# Patient Record
Sex: MALE | Race: WHITE | NOT HISPANIC OR LATINO | URBAN - METROPOLITAN AREA
[De-identification: names, ages, dates, MRNs, and addresses within clinical notes are randomized per-mention and may not be internally consistent; named-entity substitution may affect disease eponyms.]

---

## 2017-04-14 ENCOUNTER — EMERGENCY (EMERGENCY)
Facility: HOSPITAL | Age: 62
LOS: 1 days | Discharge: HOME | End: 2017-04-14
Attending: EMERGENCY MEDICINE | Admitting: EMERGENCY MEDICINE
Payer: COMMERCIAL

## 2017-04-14 VITALS
OXYGEN SATURATION: 96 % | SYSTOLIC BLOOD PRESSURE: 124 MMHG | DIASTOLIC BLOOD PRESSURE: 80 MMHG | HEART RATE: 82 BPM | RESPIRATION RATE: 18 BRPM | TEMPERATURE: 98 F

## 2017-04-14 DIAGNOSIS — S20.461A INSECT BITE (NONVENOMOUS) OF RIGHT BACK WALL OF THORAX, INITIAL ENCOUNTER: ICD-10-CM
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
## 2017-04-14 DIAGNOSIS — S30.860A INSECT BITE (NONVENOMOUS) OF LOWER BACK AND PELVIS, INITIAL ENCOUNTER: ICD-10-CM
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
## 2017-04-14 DIAGNOSIS — Z88.0 ALLERGY STATUS TO PENICILLIN: ICD-10-CM

## 2017-04-14 PROCEDURE — 99283 EMERGENCY DEPT VISIT LOW MDM: CPT

## 2017-04-14 PROCEDURE — 99283 EMERGENCY DEPT VISIT LOW MDM: CPT | Mod: 25

## 2017-04-14 RX ADMIN — Medication 200 MILLIGRAM(S): at 23:32

## 2017-04-14 NOTE — ED PROVIDER NOTE - OBJECTIVE STATEMENT
60y/o male c/o tick bite. pt states noticed tick on back 20 minutes prior to arrival. pt feeling well. pt states doorman was unable to remove tick. pt admits to recent [facility] travel but states only noticed tick today. no joint pain or rashes. no fever or chills. no further complaints.

## 2017-04-14 NOTE — ED ADULT NURSE NOTE - OBJECTIVE STATEMENT
Pt CO Tick bite to Right Flank with Pain 6/10.  Pt states "I had my doorman pick at it, and I felt the sting a couple hours ago but I don't know when the bite occurred."  Pt denies N/V/D, SOB, Fevers at this time.  No PMHx per pt.

## 2017-04-14 NOTE — ED PROVIDER NOTE - MEDICAL DECISION MAKING DETAILS
tick to lower back: no rash present. no evidence of infection. tick removed and sent to lab. doxycycline given. f/u with pmd for lyme testing.

## 2020-07-21 NOTE — ED ADULT TRIAGE NOTE - SPO2 (%)
Instructions: This plan will send the code FBSE to the PM system.  DO NOT or CHANGE the price. Price (Do Not Change): 0.00 Detail Level: Simple 96